# Patient Record
Sex: FEMALE | Race: WHITE | URBAN - METROPOLITAN AREA
[De-identification: names, ages, dates, MRNs, and addresses within clinical notes are randomized per-mention and may not be internally consistent; named-entity substitution may affect disease eponyms.]

---

## 2023-02-22 ENCOUNTER — OFFICE VISIT (OUTPATIENT)
Dept: URBAN - METROPOLITAN AREA CLINIC 30 | Facility: CLINIC | Age: 53
End: 2023-02-22
Payer: COMMERCIAL

## 2023-02-22 DIAGNOSIS — H33.21 RETINAL DETACHMENT OF RIGHT EYE: Primary | ICD-10-CM

## 2023-02-22 DIAGNOSIS — H25.13 AGE-RELATED NUCLEAR CATARACT, BILATERAL: ICD-10-CM

## 2023-02-22 DIAGNOSIS — H43.813 VITREOUS DEGENERATION, BILATERAL: ICD-10-CM

## 2023-02-22 PROCEDURE — 92134 CPTRZ OPH DX IMG PST SGM RTA: CPT

## 2023-02-22 PROCEDURE — 92004 COMPRE OPH EXAM NEW PT 1/>: CPT

## 2023-02-22 ASSESSMENT — VISUAL ACUITY
OD: 20/30
OS: 20/40

## 2023-02-22 ASSESSMENT — INTRAOCULAR PRESSURE
OD: 19
OS: 20

## 2023-02-22 NOTE — IMPRESSION/PLAN
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 Plan: H/o RD OD x 2013. Flat and attached on exam today, monitor.

## 2023-02-22 NOTE — IMPRESSION/PLAN
Impression: Age-related nuclear cataract, bilateral: H25.13. Plan: VS, pt prefers to wait. Understands MRX will not significantly improve vision. Pt wants Bifocal RX. Update.